# Patient Record
Sex: FEMALE | Race: WHITE | Employment: FULL TIME | URBAN - METROPOLITAN AREA
[De-identification: names, ages, dates, MRNs, and addresses within clinical notes are randomized per-mention and may not be internally consistent; named-entity substitution may affect disease eponyms.]

---

## 2024-06-14 ENCOUNTER — HOSPITAL ENCOUNTER (EMERGENCY)
Facility: HOSPITAL | Age: 44
Discharge: HOME | End: 2024-06-15
Attending: EMERGENCY MEDICINE
Payer: COMMERCIAL

## 2024-06-14 DIAGNOSIS — R42 DIZZINESS: Primary | ICD-10-CM

## 2024-06-14 DIAGNOSIS — R00.2 PALPITATION: ICD-10-CM

## 2024-06-14 LAB
ALBUMIN SERPL-MCNC: 4.2 G/DL (ref 3.5–5)
ALBUMIN/GLOB SERPL: 1.3 RATIO (ref 1.1–2)
ALLENS TEST BLOOD GAS (OHS): ABNORMAL
ALP SERPL-CCNC: 66 UNIT/L (ref 40–150)
ALT SERPL-CCNC: 25 UNIT/L (ref 0–55)
ANION GAP SERPL CALC-SCNC: 12 MEQ/L
AST SERPL-CCNC: 18 UNIT/L (ref 5–34)
BACTERIA #/AREA URNS AUTO: ABNORMAL /HPF
BASE EXCESS BLD CALC-SCNC: -1.7 MMOL/L (ref -2–2)
BASOPHILS # BLD AUTO: 0.02 X10(3)/MCL
BASOPHILS NFR BLD AUTO: 0.3 %
BILIRUB SERPL-MCNC: 0.3 MG/DL
BILIRUB UR QL STRIP.AUTO: NEGATIVE
BLOOD GAS SAMPLE TYPE (OHS): ABNORMAL
BUN SERPL-MCNC: 13.8 MG/DL (ref 7–18.7)
CA-I BLD-SCNC: 1.17 MMOL/L (ref 1.12–1.23)
CALCIUM SERPL-MCNC: 9.6 MG/DL (ref 8.4–10.2)
CHLORIDE SERPL-SCNC: 111 MMOL/L (ref 98–107)
CLARITY UR: CLEAR
CO2 BLDA-SCNC: 22.7 MMOL/L
CO2 SERPL-SCNC: 17 MMOL/L (ref 22–29)
COHGB MFR BLDA: 0.8 % (ref 0.5–1.5)
COLOR UR AUTO: COLORLESS
CREAT SERPL-MCNC: 0.77 MG/DL (ref 0.55–1.02)
CREAT/UREA NIT SERPL: 18
DRAWN BY BLOOD GAS (OHS): ABNORMAL
EOSINOPHIL # BLD AUTO: 0.11 X10(3)/MCL (ref 0–0.9)
EOSINOPHIL NFR BLD AUTO: 1.4 %
ERYTHROCYTE [DISTWIDTH] IN BLOOD BY AUTOMATED COUNT: 12.1 % (ref 11.5–17)
GFR SERPLBLD CREATININE-BSD FMLA CKD-EPI: >60 ML/MIN/1.73/M2
GLOBULIN SER-MCNC: 3.2 GM/DL (ref 2.4–3.5)
GLUCOSE SERPL-MCNC: 180 MG/DL (ref 74–100)
GLUCOSE UR QL STRIP: NORMAL
HCO3 BLDA-SCNC: 21.7 MMOL/L (ref 22–26)
HCT VFR BLD AUTO: 39 % (ref 37–47)
HGB BLD-MCNC: 12.8 G/DL (ref 12–16)
HGB UR QL STRIP: ABNORMAL
IMM GRANULOCYTES # BLD AUTO: 0.03 X10(3)/MCL (ref 0–0.04)
IMM GRANULOCYTES NFR BLD AUTO: 0.4 %
KETONES UR QL STRIP: NEGATIVE
LACTATE SERPL-SCNC: 2.3 MMOL/L (ref 0.5–2.2)
LEUKOCYTE ESTERASE UR QL STRIP: NEGATIVE
LYMPHOCYTES # BLD AUTO: 2.87 X10(3)/MCL (ref 0.6–4.6)
LYMPHOCYTES NFR BLD AUTO: 37.5 %
MAGNESIUM SERPL-MCNC: 2 MG/DL (ref 1.6–2.6)
MCH RBC QN AUTO: 29.9 PG (ref 27–31)
MCHC RBC AUTO-ENTMCNC: 32.8 G/DL (ref 33–36)
MCV RBC AUTO: 91.1 FL (ref 80–94)
METHGB MFR BLDA: 0.8 % (ref 0.4–1.5)
MONOCYTES # BLD AUTO: 0.62 X10(3)/MCL (ref 0.1–1.3)
MONOCYTES NFR BLD AUTO: 8.1 %
NEUTROPHILS # BLD AUTO: 4.01 X10(3)/MCL (ref 2.1–9.2)
NEUTROPHILS NFR BLD AUTO: 52.3 %
NITRITE UR QL STRIP: NEGATIVE
NRBC BLD AUTO-RTO: 0 %
O2 HB BLOOD GAS (OHS): 96.1 % (ref 94–97)
OXYHGB MFR BLDA: 12.5 G/DL (ref 12–16)
PCO2 BLDA: 32 MMHG (ref 35–45)
PH BLDA: 7.44 [PH] (ref 7.35–7.45)
PH UR STRIP: 6.5 [PH]
PLATELET # BLD AUTO: 263 X10(3)/MCL (ref 130–400)
PMV BLD AUTO: 9.6 FL (ref 7.4–10.4)
PO2 BLDA: 81 MMHG (ref 80–100)
POTASSIUM BLOOD GAS (OHS): 3.5 MMOL/L (ref 3.5–5)
POTASSIUM SERPL-SCNC: 3.9 MMOL/L (ref 3.5–5.1)
PROT SERPL-MCNC: 7.4 GM/DL (ref 6.4–8.3)
PROT UR QL STRIP: NEGATIVE
RBC # BLD AUTO: 4.28 X10(6)/MCL (ref 4.2–5.4)
RBC #/AREA URNS AUTO: ABNORMAL /HPF
SAMPLE SITE BLOOD GAS (OHS): ABNORMAL
SAO2 % BLDA: 96.3 %
SODIUM BLOOD GAS (OHS): 134 MMOL/L (ref 137–145)
SODIUM SERPL-SCNC: 140 MMOL/L (ref 136–145)
SP GR UR STRIP.AUTO: 1.01 (ref 1–1.03)
SQUAMOUS #/AREA URNS LPF: ABNORMAL /HPF
TSH SERPL-ACNC: 2.96 UIU/ML (ref 0.35–4.94)
UROBILINOGEN UR STRIP-ACNC: NORMAL
WBC # BLD AUTO: 7.66 X10(3)/MCL (ref 4.5–11.5)
WBC #/AREA URNS AUTO: ABNORMAL /HPF

## 2024-06-14 PROCEDURE — 83735 ASSAY OF MAGNESIUM: CPT | Performed by: EMERGENCY MEDICINE

## 2024-06-14 PROCEDURE — 85025 COMPLETE CBC W/AUTO DIFF WBC: CPT | Performed by: EMERGENCY MEDICINE

## 2024-06-14 PROCEDURE — 84443 ASSAY THYROID STIM HORMONE: CPT | Performed by: EMERGENCY MEDICINE

## 2024-06-14 PROCEDURE — 82803 BLOOD GASES ANY COMBINATION: CPT

## 2024-06-14 PROCEDURE — 83605 ASSAY OF LACTIC ACID: CPT | Performed by: EMERGENCY MEDICINE

## 2024-06-14 PROCEDURE — 99900035 HC TECH TIME PER 15 MIN (STAT)

## 2024-06-14 PROCEDURE — 93005 ELECTROCARDIOGRAM TRACING: CPT

## 2024-06-14 PROCEDURE — 81001 URINALYSIS AUTO W/SCOPE: CPT | Performed by: EMERGENCY MEDICINE

## 2024-06-14 PROCEDURE — 36600 WITHDRAWAL OF ARTERIAL BLOOD: CPT

## 2024-06-14 PROCEDURE — 99285 EMERGENCY DEPT VISIT HI MDM: CPT | Mod: 25

## 2024-06-14 PROCEDURE — 96360 HYDRATION IV INFUSION INIT: CPT

## 2024-06-14 PROCEDURE — 80053 COMPREHEN METABOLIC PANEL: CPT | Performed by: EMERGENCY MEDICINE

## 2024-06-14 PROCEDURE — 63600175 PHARM REV CODE 636 W HCPCS: Performed by: EMERGENCY MEDICINE

## 2024-06-14 RX ORDER — SODIUM CHLORIDE, SODIUM LACTATE, POTASSIUM CHLORIDE, CALCIUM CHLORIDE 600; 310; 30; 20 MG/100ML; MG/100ML; MG/100ML; MG/100ML
500 INJECTION, SOLUTION INTRAVENOUS
Status: COMPLETED | OUTPATIENT
Start: 2024-06-14 | End: 2024-06-14

## 2024-06-14 RX ADMIN — SODIUM CHLORIDE, POTASSIUM CHLORIDE, SODIUM LACTATE AND CALCIUM CHLORIDE 500 ML: 600; 310; 30; 20 INJECTION, SOLUTION INTRAVENOUS at 10:06

## 2024-06-15 VITALS
BODY MASS INDEX: 23.32 KG/M2 | OXYGEN SATURATION: 97 % | RESPIRATION RATE: 17 BRPM | TEMPERATURE: 98 F | HEIGHT: 65 IN | DIASTOLIC BLOOD PRESSURE: 65 MMHG | WEIGHT: 140 LBS | SYSTOLIC BLOOD PRESSURE: 99 MMHG | HEART RATE: 56 BPM

## 2024-06-15 LAB
OHS QRS DURATION: 94 MS
OHS QTC CALCULATION: 408 MS

## 2024-06-15 NOTE — ED NOTES
Pt given DC instructions, p instructed to follow up with PCP within next few days, pt also instructed to follow up with neurologist and internal medicine MD, pt verbalizes understanding and denies questions and complaints at this moment, pt NAD, pt ambulated toward ER exit, gait steady, pt NAD

## 2024-06-15 NOTE — ED PROVIDER NOTES
Encounter Date: 6/14/2024    SCRIBE #1 NOTE: I, Leigh Millicent, am scribing for, and in the presence of,  Huber Rojas MD. I have scribed the following portions of the note - the EKG reading. Other sections scribed: HPI, ROS, PE.       History     Chief Complaint   Patient presents with    Dizziness     Began having dizziness/lightheadedness since 06/05/2024. Seen at , prescribed metoprolol for freq PVC per pt. Pt report still having dizziness, heart palpitations, gen weakness.      Patient is a 44 year old female that presents to the ED for intermittent episodes of lightheadedness since March. Patient reports the current episode has lasted for about an hour; notes they are worse when she stands. She also complains of a headache and palpitation today, both which have resolved. She also complains of a decreased appetite. She reports generalized weakness when the episodes resolve. She denies chest pain, shortness of breath, rhinorrhea, congestion, ear pain, difficulty hearing, and changes in menstrual cycle. Patient recently seen at an Urgent Care and diagnosed with frequent PVCs.     The history is provided by the patient, the spouse and medical records. No  was used.     Review of patient's allergies indicates:   Allergen Reactions    Iodine      History reviewed. No pertinent past medical history.  History reviewed. No pertinent surgical history.  No family history on file.  Social History     Tobacco Use    Smoking status: Never    Smokeless tobacco: Never     Review of Systems   Constitutional:  Positive for appetite change. Negative for fatigue, fever and unexpected weight change.        Generalized weakness    HENT:  Negative for congestion, ear pain, hearing loss and rhinorrhea.    Eyes:  Negative for pain.   Respiratory:  Negative for chest tightness, shortness of breath and wheezing.    Cardiovascular:  Positive for palpitations. Negative for chest pain.   Gastrointestinal:  Negative  for abdominal pain, constipation, diarrhea, nausea and vomiting.   Genitourinary:  Negative for dysuria.   Musculoskeletal:  Negative for back pain and neck pain.   Skin:  Negative for rash.   Allergic/Immunologic: Negative for environmental allergies, food allergies and immunocompromised state.   Neurological:  Positive for light-headedness and headaches. Negative for dizziness and speech difficulty.   Hematological:  Does not bruise/bleed easily.   Psychiatric/Behavioral:  Negative for sleep disturbance and suicidal ideas.        Physical Exam     Initial Vitals [06/14/24 2126]   BP Pulse Resp Temp SpO2   136/67 (!) 57 16 97.9 °F (36.6 °C) 97 %      MAP       --         Physical Exam    Nursing note and vitals reviewed.  Constitutional: No distress.   HENT:   Head: Normocephalic and atraumatic.   Neck: Trachea normal.   Cardiovascular:  Normal rate and regular rhythm.           No murmur heard.  Pulmonary/Chest: Breath sounds normal. No respiratory distress.   Abdominal: Abdomen is soft. Bowel sounds are normal. She exhibits no distension. There is no abdominal tenderness.   Musculoskeletal:         General: Normal range of motion.      Lumbar back: Normal range of motion.     Neurological: She is alert and oriented to person, place, and time. She has normal strength. No cranial nerve deficit.   Skin: Skin is warm and dry. No rash noted.   Psychiatric: She has a normal mood and affect. Judgment normal.         ED Course   Procedures  Labs Reviewed   COMPREHENSIVE METABOLIC PANEL - Abnormal; Notable for the following components:       Result Value    Chloride 111 (*)     CO2 17 (*)     Glucose 180 (*)     All other components within normal limits   LACTIC ACID, PLASMA - Abnormal; Notable for the following components:    Lactic Acid Level 2.3 (*)     All other components within normal limits   CBC WITH DIFFERENTIAL - Abnormal; Notable for the following components:    MCHC 32.8 (*)     All other components within  normal limits   BLOOD GAS - Abnormal; Notable for the following components:    pCO2, Blood gas 32.0 (*)     Sodium, Blood Gas 134 (*)     HCO3, Blood gas 21.7 (*)     All other components within normal limits   MAGNESIUM - Normal   CBC W/ AUTO DIFFERENTIAL    Narrative:     The following orders were created for panel order CBC auto differential.  Procedure                               Abnormality         Status                     ---------                               -----------         ------                     CBC with Differential[[id number]]       Abnormal            Final result                 Please view results for these tests on the individual orders.   URINALYSIS, REFLEX TO URINE CULTURE   LACTIC ACID, PLASMA   TSH   PREGNANCY TEST, URINE RAPID     EKG Readings: (Independently Interpreted)   Initial Reading: No STEMI. Rhythm: Sinus Bradycardia. Heart Rate: 58. Ectopy: No Ectopy. Conduction: Normal. ST Segments: Normal ST Segments. T Waves: Normal. Axis: Normal. Clinical Impression: Sinus Bradycardia   2113       Imaging Results              CT Head Without Contrast (Final result)  Result time 06/14/24 22:56:10      Final result by Christopher Walsh MD (06/14/24 22:56:10)                   Impression:      No acute intracranial findings identified.      Electronically signed by: Christopher Walsh  Date:    06/14/2024  Time:    22:56               Narrative:    EXAMINATION:  CT HEAD WITHOUT CONTRAST    CLINICAL HISTORY:  Dizziness, persistent/recurrent, cardiac or vascular cause suspected;    TECHNIQUE:  Sequential axial images were performed of the brain without contrast.    Dose product length of 869 mGycm. Automated exposure control was utilized to minimize radiation dose.    COMPARISON:  None available.    FINDINGS:  Gray-white matter differentiation is unremarkable.  There is no intracranial mass effect, midline shift, hydrocephalus or hemorrhage. There is no sulcal effacement or low attenuation changes  to suggest recent large vessel territory infarction.  There is no acute extra axial fluid collection. Visualized paranasal sinuses are clear without mucosal thickening, polypoidal abnormality or air-fluid levels. Mastoid air cells aeration is optimal.                                       X-Ray Chest 1 View (Final result)  Result time 06/14/24 22:14:56      Final result by Christopher Walsh MD (06/14/24 22:14:56)                   Impression:      No acute cardiopulmonary process identified.      Electronically signed by: Christopher Walsh  Date:    06/14/2024  Time:    22:14               Narrative:    EXAMINATION:  XR CHEST 1 VIEW    CLINICAL HISTORY:  weakness;    TECHNIQUE:  One view    COMPARISON:  None available.    FINDINGS:  Cardiopericardial silhouette is within normal limits.  No acute dense focal or segmental consolidation, congestive process, pleural effusions or pneumothorax.                                       Medications   lactated ringers infusion (500 mLs Intravenous New Bag 6/14/24 4983)     Medical Decision Making  Differential diagnosis includes, but is not limited to, vertigo, anemia, and sinusitis.     Patient states that she was treated for vertigo before and medicine did not work therefore she does not thinks it was vertigo she was normal finger-to-nose and no nystagmus.  CT head will be done secondary to persistent worsening vertigo it was negative neurologically intact.  Patient has follow-up with Cardiology will continue metoprolol does feel somewhat better after IV fluids symptoms have been going on for 3 months.  Patient will follow up with Neurology as well return emergency room as needed    Problems Addressed:  Dizziness: complicated acute illness or injury with systemic symptoms that poses a threat to life or bodily functions    Amount and/or Complexity of Data Reviewed  Independent Historian: spouse     Details: Patient's  at bedside corresponds with the patient's story.   Labs:  ordered.  Radiology: ordered and independent interpretation performed.  ECG/medicine tests: ordered and independent interpretation performed.     Details: 2113. Initial Reading: No STEMI. Rhythm: Sinus Bradycardia. Heart Rate: 58. Ectopy: No Ectopy. Conduction: Normal. ST Segments: Normal ST Segments. T Waves: Normal. Axis: Normal. Clinical Impression: Sinus Bradycardia     Risk  Prescription drug management.            Scribe Attestation:   Scribe #1: I performed the above scribed service and the documentation accurately describes the services I performed. I attest to the accuracy of the note.    Attending Attestation:           Physician Attestation for Scribe:  Physician Attestation Statement for Scribe #1: I, Huber Rojas MD, reviewed documentation, as scribed by Leigh Ricks in my presence, and it is both accurate and complete.                                    Clinical Impression:  Final diagnoses:  [R00.2] Palpitation  [R42] Dizziness (Primary)          ED Disposition Condition    Discharge Stable          ED Prescriptions    None       Follow-up Information       Follow up With Specialties Details Why Contact Info    PCP  In 3 days      Ry Benavides MD Internal Medicine   [address] [facility] [place] [address]  [phone]      Izabela Varma MD Neurology In 2 weeks  [address] [place] [address]  [address]  [place] [address]  [phone]               Huber Rojas III, MD  06/14/24 1183